# Patient Record
Sex: MALE | Race: WHITE | HISPANIC OR LATINO | URBAN - METROPOLITAN AREA
[De-identification: names, ages, dates, MRNs, and addresses within clinical notes are randomized per-mention and may not be internally consistent; named-entity substitution may affect disease eponyms.]

---

## 2017-03-09 ENCOUNTER — IMPORTED ENCOUNTER (OUTPATIENT)
Dept: URBAN - METROPOLITAN AREA CLINIC 50 | Facility: CLINIC | Age: 82
End: 2017-03-09

## 2017-03-27 ENCOUNTER — IMPORTED ENCOUNTER (OUTPATIENT)
Dept: URBAN - METROPOLITAN AREA CLINIC 50 | Facility: CLINIC | Age: 82
End: 2017-03-27

## 2017-04-06 ENCOUNTER — IMPORTED ENCOUNTER (OUTPATIENT)
Dept: URBAN - METROPOLITAN AREA CLINIC 50 | Facility: CLINIC | Age: 82
End: 2017-04-06

## 2017-04-18 ENCOUNTER — IMPORTED ENCOUNTER (OUTPATIENT)
Dept: URBAN - METROPOLITAN AREA CLINIC 50 | Facility: CLINIC | Age: 82
End: 2017-04-18

## 2017-04-28 ENCOUNTER — IMPORTED ENCOUNTER (OUTPATIENT)
Dept: URBAN - METROPOLITAN AREA CLINIC 50 | Facility: CLINIC | Age: 82
End: 2017-04-28

## 2017-05-02 ENCOUNTER — IMPORTED ENCOUNTER (OUTPATIENT)
Dept: URBAN - METROPOLITAN AREA CLINIC 50 | Facility: CLINIC | Age: 82
End: 2017-05-02

## 2017-05-12 ENCOUNTER — IMPORTED ENCOUNTER (OUTPATIENT)
Dept: URBAN - METROPOLITAN AREA CLINIC 50 | Facility: CLINIC | Age: 82
End: 2017-05-12

## 2017-06-02 ENCOUNTER — IMPORTED ENCOUNTER (OUTPATIENT)
Dept: URBAN - METROPOLITAN AREA CLINIC 50 | Facility: CLINIC | Age: 82
End: 2017-06-02

## 2017-09-29 ENCOUNTER — IMPORTED ENCOUNTER (OUTPATIENT)
Dept: URBAN - METROPOLITAN AREA CLINIC 50 | Facility: CLINIC | Age: 82
End: 2017-09-29

## 2021-02-05 NOTE — PATIENT DISCUSSION
(+) Father.
Despite some risk factors, the patient does not demonstrate definitive evidence of glaucoma at this time.
Discussed condition and exacerbating conditions/situations (e.g., dry/arid environments, overhead fans, air conditioners, side effect of medications).
Discussed lid hygiene, warm compress and eyelid wash.
Glasses Rx given.
Monitor.
Observe.
Patient given Rx for glasses.
Patient understands condition, prognosis and need for follow up care.
RTC for baseline testing.
Recommend yearly follow-up evaluation.
Recommended artificial tears to use: 1 drop 4x a day in both eyes.
Recommended observation.
Risk of glaucoma and need for follow up reviewed with the patient.
Stable.
The IOP is in the target range.
daughter

## 2021-04-17 ASSESSMENT — TONOMETRY
OD_IOP_MMHG: 16
OD_IOP_MMHG: 19
OS_IOP_MMHG: 22
OD_IOP_MMHG: 17
OS_IOP_MMHG: 19
OS_IOP_MMHG: 16
OS_IOP_MMHG: 13
OD_IOP_MMHG: 17
OD_IOP_MMHG: 16
OS_IOP_MMHG: 16
OD_IOP_MMHG: 13
OD_IOP_MMHG: 19
OS_IOP_MMHG: 16
OS_IOP_MMHG: 16

## 2021-04-17 ASSESSMENT — VISUAL ACUITY
OD_SC: 20/70
OS_BAT: 20/>400
OS_CC: 20/400
OD_OTHER: 20/>400.
OS_OTHER: 20/>400.
OS_SC: 20/40
OD_SC: 20/30
OD_PH: 20/25
OS_SC: 20/70
OD_SC: 20/200
OS_PH: 20/60
OS_OTHER: 20/>400. 20/>400.
OD_SC: 20/60-
OD_SC: 20/30
OD_SC: 20/40
OS_OTHER: 20/>400. 20/>400.
OD_BAT: 20/>400
OS_SC: 20/70
OS_BAT: 20/>400
OD_BAT: 20/>400
OS_PH: 20/25
OD_SC: 20/30
OD_OTHER: 20/>400.
OS_SC: 20/100+
OS_SC: 20/25-
OS_PH: 20/40
OS_BAT: 20/>400
OS_SC: 20/40
OD_PH: 20/60